# Patient Record
(demographics unavailable — no encounter records)

---

## 2025-02-27 NOTE — HISTORY OF PRESENT ILLNESS
[FreeTextEntry1] : I have the pleasure to see Mr Angel today for cardiology evaluation. He is a 57 year old with PMHx of HTN, obesity, recently diagnosed with pafib during ED visits, s/p DC cardioversion  on eliquis and metoprolol.  currently stable EKG in office showed sinus rhythm. no acute ischemic changes patient complained of palpitations by time of afib with RVR , denies active chest pain , or sob, no syncope, complains of some dyspnea on moderate exertion.  currently stable upon exam   1/28/2025 echo normal EF, globally unremarkable  non smoker, no alcohol no illicit drugs strong family hx of CAD both parents in 50's   BP elevated

## 2025-02-27 NOTE — REVIEW OF SYSTEMS
[Fever] : no fever [Chills] : no chills [Dyspnea on exertion] : dyspnea during exertion [Chest Discomfort] : no chest discomfort [Lower Ext Edema] : no extremity edema [Syncope] : no syncope [Convulsions] : no convulsions [Confusion] : no confusion was observed [Negative] : Respiratory

## 2025-02-27 NOTE — ASSESSMENT
[FreeTextEntry1] : #pafib revently diagnosed, s/p DC cardioversion CHADSVASC at least 1 currently in sinus rhythm #HTN #Obesity  #Dyspnea on exertion  #Family hx of CAD  Plan: will change metoprolol tartrate to metoprolol succinate 50 mg daily  eliquis 5 mg bid for primary stroke prevention EP follow up with Dr Choudhary continue ramipril 10 mg daily  will start amlodipine 5 mg daily  monitor BP at home on regular basis will do CCTA given symptoms and risk factors will check lipid panel and HBA1C heart healthy diet exercise and weight loss discussed  hospital chart reviewed if new or worsening symptoms patient advised to see medical attention  Follow up in 2m

## 2025-03-01 NOTE — HISTORY OF PRESENT ILLNESS
[FreeTextEntry1] : Cardio: Dr. Ruiz  57 year old with recently diagnosed paroxysmal AF presenting for a post-hospital follow-up. Initially, he presented to the ED and was cardioverted by ED team and discharged home without anticoagulation. MCOT was given at discharge. Returned a few days later with AF w/RVR which self-converted in the ED. He is on Eliquis and metoprolol and denies bleeding issues. He has not returned his MCOT yet but reports an episode of palpitations on Feb 25th, confirmed on Zywie to be ~ 3 minutes of AF.    -ECG 2/26/2025 Sinus rhythm at 61 bpm -TTE: 1. Hyperdynamic global left ventricular systolic function. Left ventricular ejection fraction, by visual estimation, is 65 to 70%.  2. Normal right ventricular size and function.  3. Normal atria.  4. No hemodynamically significant valvular disease.  5. Adequate TR velocity was not obtained to accurately assess RVSP.  6. Trivial pericardial effusion localized adjacent to the right ventricle. Nonspecific color doppler flow signal seen within the pericardial fluid without evidence of connection to the cardiac chambers.

## 2025-03-01 NOTE — REASON FOR VISIT
[Symptom and Test Evaluation] : symptom and test evaluation [Arrhythmia/ECG Abnorrmalities] : arrhythmia/ECG abnormalities [FreeTextEntry3] : Dr. Ruiz

## 2025-03-01 NOTE — REVIEW OF SYSTEMS
[SOB] : no shortness of breath [Dyspnea on exertion] : not dyspnea during exertion [Chest Discomfort] : no chest discomfort [Leg Claudication] : no intermittent leg claudication [Palpitations] : palpitations [Syncope] : no syncope [Negative] : Heme/Lymph

## 2025-03-01 NOTE — ASSESSMENT
[FreeTextEntry1] : Paroxysmal AF with RVR HTN Obesity Probable sleep apnea  He is a good candidate for early rhythm control. We discussed the risks, benefits and alternatives of catheter ablation, as well as the importance of lifestyle changes and weight loss in reducing AF burden. He prefers to wait before committing to an ablation.   Plan: Continue Eliquis 5 mg q12h Continue metoprolol 50 mg ER once daily Sleep study Follow-up in 3 months or earlier as needed

## 2025-04-25 NOTE — HISTORY OF PRESENT ILLNESS
[TextBox_4] : 58 yo M with PMHx of pAFIB on Eliquis, diagnosed this January, HTN, Obesity (active lost 36 pounds so far secondary to diet and excercise works out on elliptical at gym every day 430AM, walks with wife) presents for sleep apnea evaluation. Patient denies respiratory complaints, does not "getting bronchitis twice a year".   Employed in security for [employer] Registered with [facility] program Never smoker Lives with wife [Awakes with Dry Mouth] : awakes with dry mouth [Fatigue] : fatigue [Frequent Nocturnal Awakening] : frequent nocturnal awakening [Recent  Weight Gain] : recent weight gain [Snoring] : snoring [TextBox_19] : suspected MELISSA

## 2025-05-09 NOTE — HISTORY OF PRESENT ILLNESS
[TextBox_4] : 5/9/25: Patient has been going to the gym, feeling well. SP NPSG  4/25/25: 56 yo M with PMHx of pAFIB on Eliquis, diagnosed this January, HTN, Obesity (active lost 36 pounds so far secondary to diet and excercise works out on elliptical at gym every day 430AM, walks with wife) presents for sleep apnea evaluation. Patient denies respiratory complaints, does not "getting bronchitis twice a year".   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

## 2025-05-22 NOTE — HISTORY OF PRESENT ILLNESS
[FreeTextEntry1] : Cardio: Dr. Ruiz  57 year old with recently diagnosed paroxysmal AF presenting for a post-hospital follow-up. Initially, he presented to the ED and was cardioverted by ED team and discharged home without anticoagulation. MCOT was given at discharge. Returned a few days later with AF w/RVR which self-converted in the ED. He is on Eliquis and metoprolol and denies bleeding issues. He has not returned his MCOT yet but reports an episode of palpitations on Feb 25th, confirmed on Zywie to be ~ 3 minutes of AF.   5/21/2025: He returns today for a routine follow-up. He is in persistent AF, rate-controlled in the office, but complains of fatigue, occasional palpitations, lightheadedness and decreased stamina when working out at the gym. He lost 10 lbs. Waiting for his CPAP machine to be delivered.  -ECG 5/21/2025: AF at 82 bpm -ECG 2/26/2025 Sinus rhythm at 61 bpm -TTE: 1. Hyperdynamic global left ventricular systolic function. Left ventricular ejection fraction, by visual estimation, is 65 to 70%.  2. Normal right ventricular size and function.  3. Normal atria.  4. No hemodynamically significant valvular disease.  5. Adequate TR velocity was not obtained to accurately assess RVSP.  6. Trivial pericardial effusion localized adjacent to the right ventricle. Nonspecific color doppler flow signal seen within the pericardial fluid without evidence of connection to the cardiac chambers.

## 2025-05-22 NOTE — REVIEW OF SYSTEMS
[Feeling Fatigued] : feeling fatigued [Weight Loss (___ Lbs)] : [unfilled] ~Ulb weight loss [Palpitations] : palpitations [Negative] : Psychiatric [Fever] : no fever [Headache] : no headache [Weight Gain (___ Lbs)] : no recent weight gain [Chills] : no chills [SOB] : no shortness of breath [Dyspnea on exertion] : not dyspnea during exertion [Chest Discomfort] : no chest discomfort [Lower Ext Edema] : no extremity edema [Leg Claudication] : no intermittent leg claudication [Orthopnea] : no orthopnea [PND] : no PND [Syncope] : no syncope

## 2025-05-22 NOTE — PHYSICAL EXAM
[Well Developed] : well developed [Well Nourished] : well nourished [No Acute Distress] : no acute distress [Normal Conjunctiva] : normal conjunctiva [Normal Venous Pressure] : normal venous pressure [No Carotid Bruit] : no carotid bruit [Normal S1, S2] : normal S1, S2 [No Murmur] : no murmur [No Rub] : no rub [No Gallop] : no gallop [Clear Lung Fields] : clear lung fields [Good Air Entry] : good air entry [No Respiratory Distress] : no respiratory distress  [Soft] : abdomen soft [Non Tender] : non-tender [No Masses/organomegaly] : no masses/organomegaly [Normal Bowel Sounds] : normal bowel sounds [Normal Gait] : normal gait [No Edema] : no edema [No Cyanosis] : no cyanosis [No Clubbing] : no clubbing [No Varicosities] : no varicosities [No Rash] : no rash [No Skin Lesions] : no skin lesions [No Focal Deficits] : no focal deficits [Moves all extremities] : moves all extremities [Normal Speech] : normal speech [Alert and Oriented] : alert and oriented [Normal memory] : normal memory [de-identified] : Irregularly irregular

## 2025-05-22 NOTE — PHYSICAL EXAM

## 2025-05-22 NOTE — ASSESSMENT
[FreeTextEntry1] : #Persistent AF #HTN #Pre-DM #MELISSA  He is a good candidate for early rhythm control. We discussed the risks, benefits and alternatives to AF ablation. He is agreeable to proceed.   -Schedule AF ablation on July 8th, 2025 (PFA and [facility]) -Continue Eliquis 5 mg q12h -Continue metoprolol 50 mg ER once daily

## 2025-06-05 NOTE — ASSESSMENT
[FreeTextEntry1] : #pafib revently diagnosed, s/p DC cardioversion CHADSVASC at least 1 currently in sinus rhythm #HTN controlled currently  #Obesity  #Dyspnea on exertion  #Family hx of CAD #Pre diabetes   Plan:  metoprolol succinate 50 mg daily  eliquis 5 mg bid for primary stroke prevention EP following  Dr Matta plan for ablation on 7/8/25  continue ramipril 10 mg daily  Continue  amlodipine 5 mg daily  monitor BP at home on regular basis heart healthy diet exercise and weight loss discussed Test result discussed in detail  All questions answered   if new or worsening symptoms patient advised to see medical attention  Follow up in 6m

## 2025-06-05 NOTE — REVIEW OF SYSTEMS
[Dyspnea on exertion] : dyspnea during exertion [Negative] : Respiratory [Fever] : no fever [Chills] : no chills [Chest Discomfort] : no chest discomfort [Lower Ext Edema] : no extremity edema [Syncope] : no syncope [Convulsions] : no convulsions [Confusion] : no confusion was observed

## 2025-06-05 NOTE — HISTORY OF PRESENT ILLNESS
[FreeTextEntry1] : I have the pleasure to see Mr Angel today for cardiology evaluation. He is a 57 year old with PMHx of HTN, obesity, recently diagnosed with pafib during ED visits, s/p DC cardioversion  on eliquis and metoprolol.  currently stable EKG in office showed sinus rhythm. no acute ischemic changes patient complained of palpitations by time of afib with RVR , denies active chest pain , or sob, no syncope, complains of some dyspnea on moderate exertion.  currently stable upon exam   1/28/2025 echo normal EF, globally unremarkable 3/20/2025 CCTA Ca score 0 Limited exam due to extensive mottle degrading image quality. No appreciable stenosis in the larger coronary arteries as above.  CAD-RADS 0/N. 3/6/2025 venous doppler LE negative for DVT  3/15/2025 HDL 34 LDL 73  HBA1C 5.8  6/5/2025 patient is currently stable no active symptoms  non smoker, no alcohol no illicit drugs strong family hx of CAD both parents in 50's   BP elevated   
[FreeTextEntry1] : I have the pleasure to see Mr Angel today for cardiology evaluation. He is a 57 year old with PMHx of HTN, obesity, recently diagnosed with pafib during ED visits, s/p DC cardioversion  on eliquis and metoprolol.  currently stable EKG in office showed sinus rhythm. no acute ischemic changes patient complained of palpitations by time of afib with RVR , denies active chest pain , or sob, no syncope, complains of some dyspnea on moderate exertion.  currently stable upon exam   1/28/2025 echo normal EF, globally unremarkable 3/20/2025 CCTA Ca score 0 Limited exam due to extensive mottle degrading image quality. No appreciable stenosis in the larger coronary arteries as above.  CAD-RADS 0/N. 3/6/2025 venous doppler LE negative for DVT  3/15/2025 HDL 34 LDL 73  HBA1C 5.8  6/5/2025 patient is currently stable no active symptoms  non smoker, no alcohol no illicit drugs strong family hx of CAD both parents in 50's   BP elevated   
no

## 2025-07-11 NOTE — HISTORY OF PRESENT ILLNESS
[FreeTextEntry1] : Cardio: Dr. Ruiz  57-year-old with recently diagnosed paroxysmal AF presenting for a post-hospital follow-up. Initially, he presented to the ED and was cardioverted by ED team and discharged home without anticoagulation. MCOT was given at discharge. Returned a few days later with AF w/RVR which self-converted in the ED. He is on Eliquis and metoprolol and denies bleeding issues. He has not returned his MCOT yet but reports an episode of palpitations on Feb 25th, confirmed on Zywie to be ~ 3 minutes of AF.   5/21/2025: He returns today for a routine follow-up. He is in persistent AF, rate-controlled in the office, but complains of fatigue, occasional palpitations, lightheadedness and decreased stamina when working out at the gym. He lost 10 lbs. Waiting for his CPAP machine to be delivered.  7/11/2025: He returns 1-week post-AF ablation. He is doing well. Denies palpitations, dizziness, groin pain or swelling.   -ECG 7/10/2025: Sinus rhythm 61 bpm -ECG 5/21/2025: AF at 82 bpm -ECG 2/26/2025 Sinus rhythm at 61 bpm -TTE: 1. Hyperdynamic global left ventricular systolic function. Left ventricular ejection fraction, by visual estimation, is 65 to 70%.  2. Normal right ventricular size and function.  3. Normal atria.  4. No hemodynamically significant valvular disease.  5. Adequate TR velocity was not obtained to accurately assess RVSP.  6. Trivial pericardial effusion localized adjacent to the right ventricle. Nonspecific color doppler flow signal seen within the pericardial fluid without evidence of connection to the cardiac chambers.

## 2025-07-11 NOTE — ASSESSMENT
[FreeTextEntry1] : 57-year-old male with obesity, PsAF, HTN, MELISSA. s/p AF ablation PVI (PFA) on 7/1/2025. Remains in NSR. Groins soft and well-healed. All questions answered.    #Persistent AF  #HTN #Pre-DM #MELISSA  Plan: -Follow-up in 3 months -Continue Eliquis 5 mg q12h -Continue metoprolol ER 50 q24h

## 2025-07-11 NOTE — PHYSICAL EXAM

## 2025-07-11 NOTE — PHYSICAL EXAM
